# Patient Record
Sex: FEMALE
[De-identification: names, ages, dates, MRNs, and addresses within clinical notes are randomized per-mention and may not be internally consistent; named-entity substitution may affect disease eponyms.]

---

## 2019-02-19 ENCOUNTER — HOSPITAL ENCOUNTER (EMERGENCY)
Dept: HOSPITAL 14 - H.EROB2 | Age: 34
Discharge: HOME | End: 2019-02-19
Payer: MEDICAID

## 2019-02-19 VITALS
TEMPERATURE: 98.6 F | OXYGEN SATURATION: 98 % | DIASTOLIC BLOOD PRESSURE: 80 MMHG | SYSTOLIC BLOOD PRESSURE: 125 MMHG | RESPIRATION RATE: 18 BRPM | HEART RATE: 82 BPM

## 2019-02-19 DIAGNOSIS — O48.0: ICD-10-CM

## 2019-02-19 DIAGNOSIS — Z3A.40: ICD-10-CM

## 2019-02-19 DIAGNOSIS — O26.93: Primary | ICD-10-CM

## 2019-02-19 DIAGNOSIS — R10.2: ICD-10-CM

## 2019-02-19 NOTE — OBHP
===========================

Datetime: 2019 13:51

===========================

   

IP Adm Impression:  Term, intrauterine pregnancy

IP Admit Plan:  Observation/Evaluation; Discharge home

Admit Comment, IP Provider:  33 year old MICHEL at 40.6 weeks presents for abdominal pain. She repor
ts that the pain is off and on, every 10 mintues and began at 3am. Denies vaginal bleeding, loss of f
luid from the vagina. Endorses good fetal movement. She is a patient of [facility] and was last see
n yesterday; induction scheduled at [facility] tomorrow ().

      

   OB: [facility]

   PMH: asthma as child, cannot recall last inhaler usage

   OBHx: ;  x1 2014 @ 41 wks EGA

   Meds: denies

   Allergies: denies

   Family Hx: denies

   Surgical Hx: cosmetic rhinoplasty 

   Labs: GBS neg, GC/CL neg

      

   ROS: all other systems reviewed and negative unless otheriwse noted in HPI

      

   PE: 

   VS: WNL

   Gen: comfortably lying in bed

   CV: RRR

   Resp: no respiratory distess

   Ext: no edema

   Abd: no tenderness to palpation

   Skin: no rash

   SVE: 1/0/-3

      

   A+P: 33 year old  at 40.6 weeks presents for abdominal pain.

   -NST reactive, Cat I

   -ED precautions given

   -Recommend follow-up with [facility] at next scheduled appointment/scheduled IOL

      

   Case seen and discussed with Dr Hanna Kimball PGY1 

      

   Addendum by Dr. Ferreira: I have evaluated the patient independently and I agree with the above

Pelvic Type - PN:  Adequate

Extremities - PN:  Normal

Abdomen - PN:  Normal

Back - PN:  Normal

Breast - PN:  Normal

Lungs - PN:  Normal

Heart - PN:  Normal

Thyroid - PN:  Normal

Neurologic - PN:  Normal

HEENT - PN:  Normal

General - PN:  Normal

FHR - Baseline A Provider:  130

Vital Signs Provider:  Reviewed; Within Normal Limits

IP Chief Complaint:  Maternal discomfort

NICHD Variability Prov Fetus A:  Moderate 6-25bpm

NICHD Accel Fetus A IP Provider:  15X15

FHR Category Provider Fetus A:  Category I

NICHD Decel Fetus A IP Provider:  None

Dilatation, Provider:  1

Effacement, Provider:  0

Station, Provider:  -3

Genitourinary Exam:  Normal

DTRs - PN:  Normal

## 2019-02-19 NOTE — OBDCSUM
===========================

Datetime: 02/19/2019 13:25

===========================

   

Discharged to, Provider:  Home

Follow up at, Provider:  [facility]

Disch Instr Activity:  Normal activity

Disch Instr Diet:  Regular

Discharge Time:  02/19/2019 13:30

Follow up in weeks, Provider:  scheduled Induction 2/20/19 at 1930

Disch Referrals:  None

Discharge Diagnosis Prov Other:  false labor